# Patient Record
Sex: MALE | Race: WHITE | Employment: FULL TIME | URBAN - METROPOLITAN AREA
[De-identification: names, ages, dates, MRNs, and addresses within clinical notes are randomized per-mention and may not be internally consistent; named-entity substitution may affect disease eponyms.]

---

## 2018-11-26 ENCOUNTER — OFFICE VISIT (OUTPATIENT)
Dept: ORTHOPEDIC SURGERY | Age: 58
End: 2018-11-26

## 2018-11-26 ENCOUNTER — TELEPHONE (OUTPATIENT)
Dept: ORTHOPEDIC SURGERY | Age: 58
End: 2018-11-26

## 2018-11-26 DIAGNOSIS — R52 PAIN: ICD-10-CM

## 2018-11-26 DIAGNOSIS — M75.101 TEAR OF RIGHT ROTATOR CUFF, UNSPECIFIED TEAR EXTENT: Primary | ICD-10-CM

## 2018-11-26 ASSESSMENT — ENCOUNTER SYMPTOMS: COLOR CHANGE: 0

## 2018-11-26 NOTE — PROGRESS NOTES
and decreased strength. He exhibits normal range of motion, no swelling, no effusion, no deformity, no laceration, no spasm and normal pulse. Left shoulder: He exhibits normal range of motion, no tenderness, no bony tenderness, no swelling, no effusion, no crepitus, no deformity, no laceration, no pain, no spasm, normal pulse and normal strength. Neurological: He is alert and oriented to person, place, and time. He has normal strength. No sensory deficit. Skin: Skin is warm and dry. No rash noted. No erythema. No pallor. Right shoulder-positive Correa sign, positive Neer's sign, strength 4/5 to resisted abduction. XRAY  X-ray 3 view of the right shoulder obtained and reviewed by me today in the office demonstrates a type II acromion, moderate degenerative changes and narrowing of the acromioclavicular joint, no subluxation or dislocation noted, no acute osseous abnormalities, no bony prominences, no loose bodies. MRI of the right shoulder from November 21, 2018 reviewed by me today in the office demonstrates a type II acromion, moderate hypertrophy and degenerative changes of the acromioclavicular joint, there is a small full-thickness tear through the supraspinatus tendon insertion to the greater tuberosity with surrounding rotator cuff tendinopathy, there is also a tear of the anterior labrum without any subluxation of the glenohumeral joint. Assessment:      Right shoulder rotator cuff tear  Right shoulder labral tear      Plan:      I discussed with him today his x-ray and MRI findings. I explained to him that he does have a full-thickness tear of his rotator cuff as well as a small tear of his labrum. I explained to him that he could either continue to live with this as is, we could consider conservative options such as physical therapy and cortisone injections or could proceed with definitive surgical treatment.   After explaining all options he would like to proceed with surgical

## 2018-11-27 NOTE — TELEPHONE ENCOUNTER
Office note and C9 for surgery and post op PT request faxed to [facility]. I called and confirmed forms recieved with Félix Mccormack.

## 2018-12-06 ENCOUNTER — TELEPHONE (OUTPATIENT)
Dept: ORTHOPEDIC SURGERY | Age: 58
End: 2018-12-06

## 2018-12-13 ENCOUNTER — TELEPHONE (OUTPATIENT)
Dept: ORTHOPEDIC SURGERY | Age: 58
End: 2018-12-13

## 2019-01-03 ENCOUNTER — ANESTHESIA EVENT (OUTPATIENT)
Dept: OPERATING ROOM | Age: 59
End: 2019-01-03
Payer: COMMERCIAL

## 2019-01-06 DIAGNOSIS — M75.121 COMPLETE TEAR OF RIGHT ROTATOR CUFF: Primary | ICD-10-CM

## 2019-01-06 RX ORDER — OXYCODONE HYDROCHLORIDE AND ACETAMINOPHEN 5; 325 MG/1; MG/1
1 TABLET ORAL EVERY 6 HOURS PRN
Qty: 20 TABLET | Refills: 0 | Status: CANCELLED | OUTPATIENT
Start: 2019-01-06 | End: 2019-01-13

## 2019-01-06 RX ORDER — CEPHALEXIN 250 MG/1
250 CAPSULE ORAL 4 TIMES DAILY
Qty: 4 CAPSULE | Refills: 0 | Status: CANCELLED | OUTPATIENT
Start: 2019-01-06 | End: 2019-01-07

## 2019-01-07 ENCOUNTER — HOSPITAL ENCOUNTER (OUTPATIENT)
Dept: PREADMISSION TESTING | Age: 59
Discharge: HOME | End: 2019-01-11
Payer: COMMERCIAL

## 2019-01-07 VITALS
BODY MASS INDEX: 25.78 KG/M2 | HEIGHT: 68 IN | TEMPERATURE: 97.3 F | WEIGHT: 170.13 LBS | SYSTOLIC BLOOD PRESSURE: 133 MMHG | RESPIRATION RATE: 16 BRPM | OXYGEN SATURATION: 97 % | DIASTOLIC BLOOD PRESSURE: 76 MMHG | HEART RATE: 72 BPM

## 2019-01-07 DIAGNOSIS — M75.101 TEAR OF RIGHT ROTATOR CUFF, UNSPECIFIED TEAR EXTENT: Primary | ICD-10-CM

## 2019-01-07 LAB
ANION GAP SERPL CALCULATED.3IONS-SCNC: 11 MMOL/L (ref 4–16)
BUN BLDV-MCNC: 26 MG/DL (ref 6–23)
CALCIUM SERPL-MCNC: 9.5 MG/DL (ref 8.3–10.6)
CHLORIDE BLD-SCNC: 103 MMOL/L (ref 99–110)
CO2: 25 MMOL/L (ref 21–32)
CREAT SERPL-MCNC: 1.3 MG/DL (ref 0.9–1.3)
GFR AFRICAN AMERICAN: >60 ML/MIN/1.73M2
GFR NON-AFRICAN AMERICAN: 57 ML/MIN/1.73M2
GLUCOSE BLD-MCNC: 89 MG/DL (ref 70–99)
HCT VFR BLD CALC: 45.9 % (ref 42–52)
HEMOGLOBIN: 14.8 GM/DL (ref 13.5–18)
MCH RBC QN AUTO: 30.9 PG (ref 27–31)
MCHC RBC AUTO-ENTMCNC: 32.2 % (ref 32–36)
MCV RBC AUTO: 95.8 FL (ref 78–100)
PDW BLD-RTO: 13.4 % (ref 11.7–14.9)
PLATELET # BLD: 298 K/CU MM (ref 140–440)
PMV BLD AUTO: 9.7 FL (ref 7.5–11.1)
POTASSIUM SERPL-SCNC: 4.9 MMOL/L (ref 3.5–5.1)
RBC # BLD: 4.79 M/CU MM (ref 4.6–6.2)
SODIUM BLD-SCNC: 139 MMOL/L (ref 135–145)
WBC # BLD: 7 K/CU MM (ref 4–10.5)

## 2019-01-07 PROCEDURE — 85027 COMPLETE CBC AUTOMATED: CPT

## 2019-01-07 PROCEDURE — 80048 BASIC METABOLIC PNL TOTAL CA: CPT

## 2019-01-07 PROCEDURE — 36415 COLL VENOUS BLD VENIPUNCTURE: CPT

## 2019-01-07 RX ORDER — OXYCODONE HYDROCHLORIDE AND ACETAMINOPHEN 5; 325 MG/1; MG/1
1 TABLET ORAL EVERY 6 HOURS PRN
Qty: 20 TABLET | Refills: 0 | Status: SHIPPED | OUTPATIENT
Start: 2019-01-07 | End: 2019-01-14

## 2019-01-07 RX ORDER — CEPHALEXIN 250 MG/1
250 CAPSULE ORAL 4 TIMES DAILY
Qty: 4 CAPSULE | Refills: 0 | Status: SHIPPED | OUTPATIENT
Start: 2019-01-07 | End: 2019-01-08

## 2019-01-10 ENCOUNTER — HOSPITAL ENCOUNTER (OUTPATIENT)
Age: 59
Setting detail: OUTPATIENT SURGERY
Discharge: HOME | End: 2019-01-10
Attending: ORTHOPAEDIC SURGERY | Admitting: ORTHOPAEDIC SURGERY
Payer: COMMERCIAL

## 2019-01-10 ENCOUNTER — ANESTHESIA (OUTPATIENT)
Dept: OPERATING ROOM | Age: 59
End: 2019-01-10
Payer: COMMERCIAL

## 2019-01-10 VITALS
HEART RATE: 59 BPM | OXYGEN SATURATION: 97 % | DIASTOLIC BLOOD PRESSURE: 66 MMHG | RESPIRATION RATE: 16 BRPM | TEMPERATURE: 97.9 F | SYSTOLIC BLOOD PRESSURE: 106 MMHG

## 2019-01-10 VITALS
RESPIRATION RATE: 2 BRPM | TEMPERATURE: 96.8 F | SYSTOLIC BLOOD PRESSURE: 96 MMHG | OXYGEN SATURATION: 99 % | DIASTOLIC BLOOD PRESSURE: 70 MMHG

## 2019-01-10 PROCEDURE — 29826 SHO ARTHRS SRG DECOMPRESSION: CPT | Performed by: ORTHOPAEDIC SURGERY

## 2019-01-10 PROCEDURE — 2720000010 HC SURG SUPPLY STERILE: Performed by: ORTHOPAEDIC SURGERY

## 2019-01-10 PROCEDURE — 2500000003 HC RX 250 WO HCPCS: Performed by: NURSE ANESTHETIST, CERTIFIED REGISTERED

## 2019-01-10 PROCEDURE — 7100000001 HC PACU RECOVERY - ADDTL 15 MIN: Performed by: ORTHOPAEDIC SURGERY

## 2019-01-10 PROCEDURE — 6360000002 HC RX W HCPCS: Performed by: ORTHOPAEDIC SURGERY

## 2019-01-10 PROCEDURE — 64415 NJX AA&/STRD BRCH PLXS IMG: CPT | Performed by: ANESTHESIOLOGY

## 2019-01-10 PROCEDURE — 2580000003 HC RX 258: Performed by: ORTHOPAEDIC SURGERY

## 2019-01-10 PROCEDURE — 29827 SHO ARTHRS SRG RT8TR CUF RPR: CPT | Performed by: ORTHOPAEDIC SURGERY

## 2019-01-10 PROCEDURE — L3999 UPPER LIMB ORTHOSIS NOS: HCPCS | Performed by: ORTHOPAEDIC SURGERY

## 2019-01-10 PROCEDURE — 29824 SHO ARTHRS SRG DSTL CLAVICLC: CPT | Performed by: ORTHOPAEDIC SURGERY

## 2019-01-10 PROCEDURE — L3650 SO 8 ABD RESTRAINT PRE OTS: HCPCS | Performed by: ORTHOPAEDIC SURGERY

## 2019-01-10 PROCEDURE — 6360000002 HC RX W HCPCS: Performed by: NURSE ANESTHETIST, CERTIFIED REGISTERED

## 2019-01-10 PROCEDURE — 29823 SHO ARTHRS SRG XTNSV DBRDMT: CPT | Performed by: ORTHOPAEDIC SURGERY

## 2019-01-10 PROCEDURE — 3700000001 HC ADD 15 MINUTES (ANESTHESIA): Performed by: ORTHOPAEDIC SURGERY

## 2019-01-10 PROCEDURE — 3700000000 HC ANESTHESIA ATTENDED CARE: Performed by: ORTHOPAEDIC SURGERY

## 2019-01-10 PROCEDURE — C1713 ANCHOR/SCREW BN/BN,TIS/BN: HCPCS | Performed by: ORTHOPAEDIC SURGERY

## 2019-01-10 PROCEDURE — 7100000010 HC PHASE II RECOVERY - FIRST 15 MIN: Performed by: ORTHOPAEDIC SURGERY

## 2019-01-10 PROCEDURE — 3600000014 HC SURGERY LEVEL 4 ADDTL 15MIN: Performed by: ORTHOPAEDIC SURGERY

## 2019-01-10 PROCEDURE — 6360000002 HC RX W HCPCS: Performed by: ANESTHESIOLOGY

## 2019-01-10 PROCEDURE — 2709999900 HC NON-CHARGEABLE SUPPLY: Performed by: ORTHOPAEDIC SURGERY

## 2019-01-10 PROCEDURE — 6370000000 HC RX 637 (ALT 250 FOR IP): Performed by: ANESTHESIOLOGY

## 2019-01-10 PROCEDURE — 7100000000 HC PACU RECOVERY - FIRST 15 MIN: Performed by: ORTHOPAEDIC SURGERY

## 2019-01-10 PROCEDURE — 7100000011 HC PHASE II RECOVERY - ADDTL 15 MIN: Performed by: ORTHOPAEDIC SURGERY

## 2019-01-10 PROCEDURE — 3600000004 HC SURGERY LEVEL 4 BASE: Performed by: ORTHOPAEDIC SURGERY

## 2019-01-10 DEVICE — IMPLANTABLE DEVICE: Type: IMPLANTABLE DEVICE | Status: FUNCTIONAL

## 2019-01-10 DEVICE — SPEEDSCREW 5.5 MM IMPLANT
Type: IMPLANTABLE DEVICE | Site: SHOULDER | Status: FUNCTIONAL
Brand: SPEEDSCREW

## 2019-01-10 RX ORDER — PROPOFOL 10 MG/ML
INJECTION, EMULSION INTRAVENOUS PRN
Status: DISCONTINUED | OUTPATIENT
Start: 2019-01-10 | End: 2019-01-10 | Stop reason: SDUPTHER

## 2019-01-10 RX ORDER — CEFAZOLIN SODIUM 2 G/100ML
2 INJECTION, SOLUTION INTRAVENOUS
Status: COMPLETED | OUTPATIENT
Start: 2019-01-10 | End: 2019-01-10

## 2019-01-10 RX ORDER — KETOROLAC TROMETHAMINE 5 MG/ML
1 SOLUTION OPHTHALMIC 4 TIMES DAILY
Status: DISCONTINUED | OUTPATIENT
Start: 2019-01-10 | End: 2019-01-10 | Stop reason: HOSPADM

## 2019-01-10 RX ORDER — LIDOCAINE HYDROCHLORIDE 20 MG/ML
INJECTION, SOLUTION INFILTRATION; PERINEURAL PRN
Status: DISCONTINUED | OUTPATIENT
Start: 2019-01-10 | End: 2019-01-10 | Stop reason: SDUPTHER

## 2019-01-10 RX ORDER — FENTANYL CITRATE 50 UG/ML
INJECTION, SOLUTION INTRAMUSCULAR; INTRAVENOUS PRN
Status: DISCONTINUED | OUTPATIENT
Start: 2019-01-10 | End: 2019-01-10 | Stop reason: SDUPTHER

## 2019-01-10 RX ORDER — SODIUM CHLORIDE 0.9 % (FLUSH) 0.9 %
10 SYRINGE (ML) INJECTION PRN
Status: CANCELLED | OUTPATIENT
Start: 2019-01-10

## 2019-01-10 RX ORDER — KETOROLAC TROMETHAMINE 30 MG/ML
INJECTION, SOLUTION INTRAMUSCULAR; INTRAVENOUS PRN
Status: DISCONTINUED | OUTPATIENT
Start: 2019-01-10 | End: 2019-01-10 | Stop reason: SDUPTHER

## 2019-01-10 RX ORDER — HYDRALAZINE HYDROCHLORIDE 20 MG/ML
5 INJECTION INTRAMUSCULAR; INTRAVENOUS EVERY 10 MIN PRN
Status: DISCONTINUED | OUTPATIENT
Start: 2019-01-10 | End: 2019-01-10 | Stop reason: HOSPADM

## 2019-01-10 RX ORDER — SODIUM CHLORIDE, SODIUM LACTATE, POTASSIUM CHLORIDE, CALCIUM CHLORIDE 600; 310; 30; 20 MG/100ML; MG/100ML; MG/100ML; MG/100ML
INJECTION, SOLUTION INTRAVENOUS CONTINUOUS
Status: DISCONTINUED | OUTPATIENT
Start: 2019-01-10 | End: 2019-01-10 | Stop reason: HOSPADM

## 2019-01-10 RX ORDER — ACETAMINOPHEN 10 MG/ML
1000 INJECTION, SOLUTION INTRAVENOUS ONCE
Status: COMPLETED | OUTPATIENT
Start: 2019-01-10 | End: 2019-01-10

## 2019-01-10 RX ORDER — ONDANSETRON 2 MG/ML
INJECTION INTRAMUSCULAR; INTRAVENOUS PRN
Status: DISCONTINUED | OUTPATIENT
Start: 2019-01-10 | End: 2019-01-10 | Stop reason: SDUPTHER

## 2019-01-10 RX ORDER — ACETAMINOPHEN 325 MG/1
650 TABLET ORAL EVERY 4 HOURS PRN
Status: CANCELLED | OUTPATIENT
Start: 2019-01-10

## 2019-01-10 RX ORDER — MEPERIDINE HYDROCHLORIDE 25 MG/ML
12.5 INJECTION INTRAMUSCULAR; INTRAVENOUS; SUBCUTANEOUS EVERY 5 MIN PRN
Status: DISCONTINUED | OUTPATIENT
Start: 2019-01-10 | End: 2019-01-10 | Stop reason: HOSPADM

## 2019-01-10 RX ORDER — SODIUM CHLORIDE 0.9 % (FLUSH) 0.9 %
10 SYRINGE (ML) INJECTION EVERY 12 HOURS SCHEDULED
Status: CANCELLED | OUTPATIENT
Start: 2019-01-10

## 2019-01-10 RX ORDER — SODIUM CHLORIDE 0.9 % (FLUSH) 0.9 %
10 SYRINGE (ML) INJECTION EVERY 12 HOURS SCHEDULED
Status: DISCONTINUED | OUTPATIENT
Start: 2019-01-10 | End: 2019-01-10 | Stop reason: HOSPADM

## 2019-01-10 RX ORDER — SODIUM CHLORIDE 0.9 % (FLUSH) 0.9 %
10 SYRINGE (ML) INJECTION PRN
Status: DISCONTINUED | OUTPATIENT
Start: 2019-01-10 | End: 2019-01-10 | Stop reason: HOSPADM

## 2019-01-10 RX ORDER — HYDROCODONE BITARTRATE AND ACETAMINOPHEN 5; 325 MG/1; MG/1
1 TABLET ORAL EVERY 4 HOURS PRN
Status: CANCELLED | OUTPATIENT
Start: 2019-01-10

## 2019-01-10 RX ORDER — HYDROCODONE BITARTRATE AND ACETAMINOPHEN 5; 325 MG/1; MG/1
2 TABLET ORAL EVERY 4 HOURS PRN
Status: CANCELLED | OUTPATIENT
Start: 2019-01-10

## 2019-01-10 RX ORDER — HYDROMORPHONE HCL 110MG/55ML
0.5 PATIENT CONTROLLED ANALGESIA SYRINGE INTRAVENOUS EVERY 5 MIN PRN
Status: DISCONTINUED | OUTPATIENT
Start: 2019-01-10 | End: 2019-01-10 | Stop reason: HOSPADM

## 2019-01-10 RX ORDER — LABETALOL HYDROCHLORIDE 5 MG/ML
5 INJECTION, SOLUTION INTRAVENOUS EVERY 10 MIN PRN
Status: DISCONTINUED | OUTPATIENT
Start: 2019-01-10 | End: 2019-01-10 | Stop reason: HOSPADM

## 2019-01-10 RX ORDER — DEXAMETHASONE SODIUM PHOSPHATE 4 MG/ML
INJECTION, SOLUTION INTRA-ARTICULAR; INTRALESIONAL; INTRAMUSCULAR; INTRAVENOUS; SOFT TISSUE PRN
Status: DISCONTINUED | OUTPATIENT
Start: 2019-01-10 | End: 2019-01-10 | Stop reason: SDUPTHER

## 2019-01-10 RX ORDER — DOCUSATE SODIUM 100 MG/1
100 CAPSULE, LIQUID FILLED ORAL 2 TIMES DAILY
Status: CANCELLED | OUTPATIENT
Start: 2019-01-10

## 2019-01-10 RX ORDER — FENTANYL CITRATE 50 UG/ML
50 INJECTION, SOLUTION INTRAMUSCULAR; INTRAVENOUS EVERY 5 MIN PRN
Status: DISCONTINUED | OUTPATIENT
Start: 2019-01-10 | End: 2019-01-10 | Stop reason: HOSPADM

## 2019-01-10 RX ORDER — DIPHENHYDRAMINE HYDROCHLORIDE 50 MG/ML
12.5 INJECTION INTRAMUSCULAR; INTRAVENOUS
Status: DISCONTINUED | OUTPATIENT
Start: 2019-01-10 | End: 2019-01-10 | Stop reason: HOSPADM

## 2019-01-10 RX ORDER — SODIUM CHLORIDE, SODIUM LACTATE, POTASSIUM CHLORIDE, CALCIUM CHLORIDE 600; 310; 30; 20 MG/100ML; MG/100ML; MG/100ML; MG/100ML
INJECTION, SOLUTION INTRAVENOUS CONTINUOUS
Status: CANCELLED | OUTPATIENT
Start: 2019-01-10

## 2019-01-10 RX ORDER — KETOROLAC TROMETHAMINE 30 MG/ML
30 INJECTION, SOLUTION INTRAMUSCULAR; INTRAVENOUS EVERY 6 HOURS
Status: CANCELLED | OUTPATIENT
Start: 2019-01-10 | End: 2019-01-12

## 2019-01-10 RX ORDER — PROMETHAZINE HYDROCHLORIDE 25 MG/ML
6.25 INJECTION, SOLUTION INTRAMUSCULAR; INTRAVENOUS
Status: DISCONTINUED | OUTPATIENT
Start: 2019-01-10 | End: 2019-01-10 | Stop reason: HOSPADM

## 2019-01-10 RX ORDER — ROCURONIUM BROMIDE 10 MG/ML
INJECTION, SOLUTION INTRAVENOUS PRN
Status: DISCONTINUED | OUTPATIENT
Start: 2019-01-10 | End: 2019-01-10 | Stop reason: SDUPTHER

## 2019-01-10 RX ADMIN — SODIUM CHLORIDE, POTASSIUM CHLORIDE, SODIUM LACTATE AND CALCIUM CHLORIDE: 600; 310; 30; 20 INJECTION, SOLUTION INTRAVENOUS at 08:29

## 2019-01-10 RX ADMIN — ONDANSETRON HYDROCHLORIDE 4 MG: 2 SOLUTION INTRAMUSCULAR; INTRAVENOUS at 09:07

## 2019-01-10 RX ADMIN — PROPOFOL 200 MG: 10 INJECTION, EMULSION INTRAVENOUS at 08:50

## 2019-01-10 RX ADMIN — ACETAMINOPHEN 1000 MG: 10 INJECTION, SOLUTION INTRAVENOUS at 09:15

## 2019-01-10 RX ADMIN — KETOROLAC TROMETHAMINE 30 MG: 30 INJECTION, SOLUTION INTRAMUSCULAR; INTRAVENOUS at 10:05

## 2019-01-10 RX ADMIN — LIDOCAINE HYDROCHLORIDE 100 MG: 20 INJECTION, SOLUTION INFILTRATION; PERINEURAL at 08:50

## 2019-01-10 RX ADMIN — CEFAZOLIN SODIUM 2 G: 2 INJECTION, SOLUTION INTRAVENOUS at 08:50

## 2019-01-10 RX ADMIN — SODIUM CHLORIDE, POTASSIUM CHLORIDE, SODIUM LACTATE AND CALCIUM CHLORIDE: 600; 310; 30; 20 INJECTION, SOLUTION INTRAVENOUS at 10:05

## 2019-01-10 RX ADMIN — DEXAMETHASONE SODIUM PHOSPHATE 8 MG: 4 INJECTION, SOLUTION INTRAMUSCULAR; INTRAVENOUS at 09:07

## 2019-01-10 RX ADMIN — FENTANYL CITRATE 100 MCG: 50 INJECTION INTRAMUSCULAR; INTRAVENOUS at 08:50

## 2019-01-10 RX ADMIN — ROCURONIUM BROMIDE 50 MG: 50 INJECTION, SOLUTION INTRAVENOUS at 08:50

## 2019-01-10 RX ADMIN — KETOROLAC TROMETHAMINE 1 DROP: 5 SOLUTION/ DROPS OPHTHALMIC at 11:21

## 2019-01-10 ASSESSMENT — PAIN SCALES - GENERAL
PAINLEVEL_OUTOF10: 0
PAINLEVEL_OUTOF10: 0

## 2019-01-10 ASSESSMENT — PULMONARY FUNCTION TESTS
PIF_VALUE: 19
PIF_VALUE: 14
PIF_VALUE: 19
PIF_VALUE: 9
PIF_VALUE: 19
PIF_VALUE: 19
PIF_VALUE: 14
PIF_VALUE: 4
PIF_VALUE: 19
PIF_VALUE: 18
PIF_VALUE: 19
PIF_VALUE: 19
PIF_VALUE: 18
PIF_VALUE: 18
PIF_VALUE: 19
PIF_VALUE: 2
PIF_VALUE: 18
PIF_VALUE: 17
PIF_VALUE: 20
PIF_VALUE: 6
PIF_VALUE: 18
PIF_VALUE: 17
PIF_VALUE: 18
PIF_VALUE: 19
PIF_VALUE: 18
PIF_VALUE: 0
PIF_VALUE: 18
PIF_VALUE: 19
PIF_VALUE: 18
PIF_VALUE: 19
PIF_VALUE: 18
PIF_VALUE: 20
PIF_VALUE: 15
PIF_VALUE: 17
PIF_VALUE: 8
PIF_VALUE: 19
PIF_VALUE: 18
PIF_VALUE: 18
PIF_VALUE: 19
PIF_VALUE: 19
PIF_VALUE: 15
PIF_VALUE: 18
PIF_VALUE: 19
PIF_VALUE: 15
PIF_VALUE: 12
PIF_VALUE: 18
PIF_VALUE: 16
PIF_VALUE: 19
PIF_VALUE: 2
PIF_VALUE: 19
PIF_VALUE: 18
PIF_VALUE: 19
PIF_VALUE: 1
PIF_VALUE: 19
PIF_VALUE: 18
PIF_VALUE: 19
PIF_VALUE: 15
PIF_VALUE: 25
PIF_VALUE: 19
PIF_VALUE: 6
PIF_VALUE: 15
PIF_VALUE: 18
PIF_VALUE: 2
PIF_VALUE: 1
PIF_VALUE: 19
PIF_VALUE: 18
PIF_VALUE: 18
PIF_VALUE: 19
PIF_VALUE: 18
PIF_VALUE: 19
PIF_VALUE: 19
PIF_VALUE: 18
PIF_VALUE: 19
PIF_VALUE: 18
PIF_VALUE: 16
PIF_VALUE: 26
PIF_VALUE: 15
PIF_VALUE: 3
PIF_VALUE: 8
PIF_VALUE: 19
PIF_VALUE: 18
PIF_VALUE: 19
PIF_VALUE: 18
PIF_VALUE: 19
PIF_VALUE: 18
PIF_VALUE: 18
PIF_VALUE: 19
PIF_VALUE: 15
PIF_VALUE: 15
PIF_VALUE: 18
PIF_VALUE: 15
PIF_VALUE: 19
PIF_VALUE: 1
PIF_VALUE: 17
PIF_VALUE: 19

## 2019-01-16 ENCOUNTER — OFFICE VISIT (OUTPATIENT)
Dept: ORTHOPEDIC SURGERY | Age: 59
End: 2019-01-16

## 2019-01-16 VITALS — WEIGHT: 168 LBS | HEIGHT: 69 IN | BODY MASS INDEX: 24.88 KG/M2 | RESPIRATION RATE: 12 BRPM

## 2019-01-16 DIAGNOSIS — Z98.890 S/P RIGHT ROTATOR CUFF REPAIR: Primary | ICD-10-CM

## 2019-01-16 DIAGNOSIS — Z98.890 S/P ARTHROSCOPY OF RIGHT SHOULDER: ICD-10-CM

## 2019-01-16 DIAGNOSIS — Z09 POSTOP CHECK: ICD-10-CM

## 2019-01-16 PROCEDURE — 99024 POSTOP FOLLOW-UP VISIT: CPT | Performed by: PHYSICIAN ASSISTANT

## 2019-01-16 RX ORDER — MOMETASONE FUROATE 1 MG/G
CREAM TOPICAL
Refills: 1 | COMMUNITY
Start: 2018-12-15

## 2019-01-16 RX ORDER — CEPHALEXIN 250 MG/1
CAPSULE ORAL
Refills: 0 | COMMUNITY
Start: 2019-01-10

## 2019-01-22 ENCOUNTER — HOSPITAL ENCOUNTER (OUTPATIENT)
Dept: PHYSICAL THERAPY | Age: 59
Setting detail: THERAPIES SERIES
Discharge: HOME | End: 2019-01-22
Payer: COMMERCIAL

## 2019-01-22 PROCEDURE — 97161 PT EVAL LOW COMPLEX 20 MIN: CPT

## 2019-01-22 PROCEDURE — 97110 THERAPEUTIC EXERCISES: CPT

## 2019-01-22 PROCEDURE — 97016 VASOPNEUMATIC DEVICE THERAPY: CPT

## 2019-01-22 PROCEDURE — 97140 MANUAL THERAPY 1/> REGIONS: CPT

## 2019-01-25 ENCOUNTER — HOSPITAL ENCOUNTER (OUTPATIENT)
Dept: PHYSICAL THERAPY | Age: 59
Setting detail: THERAPIES SERIES
Discharge: HOME | End: 2019-01-25
Payer: COMMERCIAL

## 2019-01-25 PROCEDURE — 97140 MANUAL THERAPY 1/> REGIONS: CPT

## 2019-01-25 PROCEDURE — 97110 THERAPEUTIC EXERCISES: CPT

## 2019-01-28 ENCOUNTER — HOSPITAL ENCOUNTER (OUTPATIENT)
Dept: PHYSICAL THERAPY | Age: 59
Setting detail: THERAPIES SERIES
Discharge: HOME | End: 2019-01-28
Payer: COMMERCIAL

## 2019-01-28 PROCEDURE — 97140 MANUAL THERAPY 1/> REGIONS: CPT

## 2019-01-28 PROCEDURE — 97016 VASOPNEUMATIC DEVICE THERAPY: CPT

## 2019-01-28 PROCEDURE — 97110 THERAPEUTIC EXERCISES: CPT

## 2019-02-01 ENCOUNTER — HOSPITAL ENCOUNTER (OUTPATIENT)
Dept: PHYSICAL THERAPY | Age: 59
Setting detail: THERAPIES SERIES
Discharge: HOME | End: 2019-02-01
Payer: COMMERCIAL

## 2019-02-01 PROCEDURE — 97110 THERAPEUTIC EXERCISES: CPT

## 2019-02-01 PROCEDURE — 97140 MANUAL THERAPY 1/> REGIONS: CPT

## 2019-02-04 ENCOUNTER — HOSPITAL ENCOUNTER (OUTPATIENT)
Dept: PHYSICAL THERAPY | Age: 59
Setting detail: THERAPIES SERIES
Discharge: HOME | End: 2019-02-04
Payer: COMMERCIAL

## 2019-02-04 PROCEDURE — 97110 THERAPEUTIC EXERCISES: CPT

## 2019-02-04 PROCEDURE — 97140 MANUAL THERAPY 1/> REGIONS: CPT

## 2019-02-08 ENCOUNTER — HOSPITAL ENCOUNTER (OUTPATIENT)
Dept: PHYSICAL THERAPY | Age: 59
Setting detail: THERAPIES SERIES
Discharge: HOME | End: 2019-02-08
Payer: COMMERCIAL

## 2019-02-08 PROCEDURE — 97110 THERAPEUTIC EXERCISES: CPT

## 2019-02-08 PROCEDURE — 97140 MANUAL THERAPY 1/> REGIONS: CPT

## 2019-02-11 ENCOUNTER — HOSPITAL ENCOUNTER (OUTPATIENT)
Dept: PHYSICAL THERAPY | Age: 59
Setting detail: THERAPIES SERIES
Discharge: HOME | End: 2019-02-11
Payer: COMMERCIAL

## 2019-02-11 PROCEDURE — 97110 THERAPEUTIC EXERCISES: CPT

## 2019-02-11 PROCEDURE — 97032 APPL MODALITY 1+ESTIM EA 15: CPT

## 2019-02-18 ENCOUNTER — HOSPITAL ENCOUNTER (OUTPATIENT)
Dept: PHYSICAL THERAPY | Age: 59
Setting detail: THERAPIES SERIES
Discharge: HOME | End: 2019-02-18
Payer: COMMERCIAL

## 2019-02-18 ENCOUNTER — OFFICE VISIT (OUTPATIENT)
Dept: ORTHOPEDIC SURGERY | Age: 59
End: 2019-02-18

## 2019-02-18 DIAGNOSIS — Z98.890 S/P RIGHT ROTATOR CUFF REPAIR: Primary | ICD-10-CM

## 2019-02-18 PROCEDURE — 99024 POSTOP FOLLOW-UP VISIT: CPT | Performed by: ORTHOPAEDIC SURGERY

## 2019-02-18 PROCEDURE — 97140 MANUAL THERAPY 1/> REGIONS: CPT

## 2019-02-18 PROCEDURE — 97110 THERAPEUTIC EXERCISES: CPT

## 2019-02-18 ASSESSMENT — ENCOUNTER SYMPTOMS: COLOR CHANGE: 0

## 2019-02-25 ENCOUNTER — HOSPITAL ENCOUNTER (OUTPATIENT)
Dept: PHYSICAL THERAPY | Age: 59
Setting detail: THERAPIES SERIES
Discharge: HOME | End: 2019-02-25
Payer: COMMERCIAL

## 2019-02-25 PROCEDURE — 97110 THERAPEUTIC EXERCISES: CPT

## 2019-02-25 PROCEDURE — 97530 THERAPEUTIC ACTIVITIES: CPT

## 2019-03-04 ENCOUNTER — HOSPITAL ENCOUNTER (OUTPATIENT)
Dept: PHYSICAL THERAPY | Age: 59
Setting detail: THERAPIES SERIES
Discharge: HOME | End: 2019-03-04
Payer: COMMERCIAL

## 2019-03-04 PROCEDURE — 97110 THERAPEUTIC EXERCISES: CPT

## 2019-03-04 PROCEDURE — 97530 THERAPEUTIC ACTIVITIES: CPT

## 2019-03-11 ENCOUNTER — HOSPITAL ENCOUNTER (OUTPATIENT)
Dept: PHYSICAL THERAPY | Age: 59
Setting detail: THERAPIES SERIES
Discharge: HOME | End: 2019-03-11
Payer: COMMERCIAL

## 2019-03-11 PROCEDURE — 97530 THERAPEUTIC ACTIVITIES: CPT

## 2019-03-11 PROCEDURE — 97110 THERAPEUTIC EXERCISES: CPT

## 2019-03-18 ENCOUNTER — HOSPITAL ENCOUNTER (OUTPATIENT)
Dept: PHYSICAL THERAPY | Age: 59
Setting detail: THERAPIES SERIES
Discharge: HOME | End: 2019-03-18
Payer: COMMERCIAL

## 2019-03-18 PROCEDURE — 97110 THERAPEUTIC EXERCISES: CPT

## 2019-03-18 PROCEDURE — 97530 THERAPEUTIC ACTIVITIES: CPT

## 2019-03-25 ENCOUNTER — HOSPITAL ENCOUNTER (OUTPATIENT)
Dept: PHYSICAL THERAPY | Age: 59
Setting detail: THERAPIES SERIES
Discharge: HOME | End: 2019-03-25
Payer: COMMERCIAL

## 2019-03-25 PROCEDURE — 97110 THERAPEUTIC EXERCISES: CPT

## 2019-03-25 PROCEDURE — 97112 NEUROMUSCULAR REEDUCATION: CPT

## 2019-04-01 ENCOUNTER — OFFICE VISIT (OUTPATIENT)
Dept: ORTHOPEDIC SURGERY | Age: 59
End: 2019-04-01

## 2019-04-01 ENCOUNTER — HOSPITAL ENCOUNTER (OUTPATIENT)
Dept: PHYSICAL THERAPY | Age: 59
Setting detail: THERAPIES SERIES
Discharge: HOME | End: 2019-04-01
Payer: COMMERCIAL

## 2019-04-01 DIAGNOSIS — Z98.890 S/P RIGHT ROTATOR CUFF REPAIR: Primary | ICD-10-CM

## 2019-04-01 PROCEDURE — 97112 NEUROMUSCULAR REEDUCATION: CPT

## 2019-04-01 PROCEDURE — 97530 THERAPEUTIC ACTIVITIES: CPT

## 2019-04-01 PROCEDURE — 97110 THERAPEUTIC EXERCISES: CPT

## 2019-04-01 PROCEDURE — 99024 POSTOP FOLLOW-UP VISIT: CPT | Performed by: ORTHOPAEDIC SURGERY

## 2019-04-01 ASSESSMENT — ENCOUNTER SYMPTOMS: COLOR CHANGE: 0

## 2019-04-01 NOTE — PROGRESS NOTES
Outpatient Physical Therapy        [x] Phone: [phone]   Fax: [phone]  Referring Practitioner: Mireya Chandra                                        From: Killian Cho PT             Patient: Rhona Sánchez                                                         : 1960  Diagnosis: R RC repair 19            Treatment Diagnosis: R shoulder stiffness          Date: 2019    [x]  Progress Note                []  Discharge Note    Total Visits to date:  15     Cancels/No-shows to date:       Subjective:  Mr. Bautista December reports consistent improvement with pain relief-sleeping well    Prominent Objective Findings:    FLEX and ER AROM near WNL-   IR behind back most limited ROM @ present    Suggested plan: [x]  Given the size of RC tear and work requirements, Continue PT and remain off work @ least through April []   Electronically signed by:  Killian Cho PT, 2019, 2:04 PM    If you have any questions or concerns, please don't hesitate to call.   Thank you for your referral.    Physician Signature:______________________ Date:______ Time: ________  By signing above, therapists plan is approved by physician

## 2019-04-01 NOTE — PROGRESS NOTES
shoulder-incision well healed, no redness, no signs of infection. Full range of motion in all directions, strength 5/5. Assessment:      Right shoulder arthroscopic rotator cuff repair, 3 months       Plan:      I discussed with him today that he is  continuing to progress extremely well. At this point he can resume all activities with no restrictions. I explained to him that maximum improvement is 9 months after this type of surgery. Continue weight-bearing as tolerated. Continue range of motion exercises as instructed. Ice and elevate as needed. Tylenol or Motrin for pain. Follow up as needed.          Dick 97, DO

## 2019-04-01 NOTE — FLOWSHEET NOTE
Code/Total Treatment Minutes:  40/40    1 TA   1 NR  1 TE  ext Progress Note due:  10 sessions      Plan of Care Interventions:  [x] Therapeutic Exercise  [] Modalities:  [x] Therapeutic Activity     [] Ultrasound  [] Estem  [] Gait Training      [] Cervical Traction [] Lumbar Traction  [x] Neuromuscular Re-education    [] Cold/hotpack [] Iontophoresis   [x] Instruction in HEP      [x] Vasopneumatic   [] Dry Needling    [x] Manual Therapy               [] Aquatic Therapy              Electronically signed by:  Stephen Martin 4/1/2019, 2:02 PM

## 2019-04-11 ENCOUNTER — HOSPITAL ENCOUNTER (OUTPATIENT)
Dept: PHYSICAL THERAPY | Age: 59
Setting detail: THERAPIES SERIES
Discharge: HOME | End: 2019-04-11
Payer: COMMERCIAL

## 2019-04-11 PROCEDURE — 97140 MANUAL THERAPY 1/> REGIONS: CPT

## 2019-04-11 PROCEDURE — 97530 THERAPEUTIC ACTIVITIES: CPT

## 2019-04-11 PROCEDURE — 97110 THERAPEUTIC EXERCISES: CPT

## 2019-04-11 NOTE — FLOWSHEET NOTE
Outpatient Physical Therapy  [place]           [x] Phone: [phone]   Fax: [phone]  [place] [place]           [] Phone: [phone]   Fax: [phone]        Physical Therapy Daily Treatment Note  Date:  2019    Patient Name:  Frannie Ta    :  1960  MRN: [id number]  Restrictions/Precautions: Other position/activity restrictions: in sling  Diagnosis:   Diagnosis: R RC repair 19  Date of Injury/Surgery: R RC repair 19  Treatment Diagnosis: Treatment Diagnosis: R shoulder stiffness    Insurance/Certification information: PT Insurance Information: BC/BS 20 sessions PCY   Referring Physician:  Referring Practitioner: Simona Osullivan  Next Doctor Visit:    Plan of care signed (Y/N):    Outcome Measure: Quick DASH  Visit# / total visits:   15/20   POC expires 19  Pain level: 0/10 R shldr  Goals:       Short term goals  Long term goals  Time Frame for Long term goals : check in 12 weeks  Long term goal 1: patient's goal- maximize function of R UE  Long term goal 2: 3+/5 R shoulder FLEX MMT  Long term goal 3:  Quick DASH    Summary of Evaluation:    Patient primary complaints: R shoulder stiffness       s-  History of condition:R RC repair 19          Current functional limitations: in sling @ all times  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   None        Objective:   R IR behind back to T12    Exercises: (No more than 4 columns)   Exercise/Equipment 3/18/19 #12 3/25/19 #13 19 #14 19  #15              WARM UP UBE FWD/BWD 3 min ea UBE FWD/BWD 5 min ea UBE FWD/BWD 5 min ea UBE FWD/BWD 5 min ea            Prone HABD           TABLE        Supine PROM- manual FLEX and ER/IR in scap plane FLEX and ER/IR in scap plane FLEX and ER/IR in scap plane FLEX and ER/IR in scap plane            Countertop walk aways 2 x15 ea next 1 x10 x1 x3    Prone EXT 3x10 with ball in hand 3x10 with 2# ball in hand 3x10 with3#  in  3x10 with4#          Standing vertical r ball rolls with B UE- elbows bent Standing abd circles ball on wall x 20, scaption Standing abd circles  2#ball on wall x 20, scaption Standing abd circles  2#ball on wall x 245, scaption     Standing vertical r ball rolls with B UE- elbows bent Lateral saw ball x 15 Standing vertical r ball rolls with B UE- elbows bent;  Standing vertical r ball rolls with B UE- elbows bent;     standing towel slides- vertical/ horizontal standing towel slides- vertical/ horizontal standing towel slides- vertical/ horizontal    x25 standing towel slides- vertical/ horizontal    x25    isometric ER  wiith walkouts- rhyth. stabs With walk outs rhyth.stab 20 x 3 eyes open, 20 x 3 eyes closed. With walk outs rhyth.stab 5 repseyes closed. With walk outs rhyth.stab 5 repseyes closed. Paloff press  BTB x 15 BTB x 15 Blue TB x 15 with RSTAB    IR AAROM  Behind back x 10 Behind back x 10 With strap x 10        ABD with ball roll up wall x30    MODALITIES                            Other Therapeutic Activities/Education:  Pt received education on their current pathology and how their condition affects functional activities. Pt understood discussion and questions were answered. Pt understands their activity limitations and understands rational for treatment progression.         Home Exercise Program: review      Manual Treatments:  PROM R shoulder      Modalities:       Communication with other providers:        Assessment: , 0/10 pain      Plan for Next Session: Cont PT progress as toelrated      Time In / Time Out: 1131/-1210  Timed Code/Total Treatment Minutes:   8\"MT x1  10\" TA x 1, 21\" TE x 1/39  ext Progress Note due:  10 sessions      Plan of Care Interventions:  [x] Therapeutic Exercise  [] Modalities:  [x] Therapeutic Activity     [] Ultrasound  [] Estem  [] Gait Training      [] Cervical Traction [] Lumbar Traction  [x] Neuromuscular Re-education    [] Cold/hotpack [] Iontophoresis   [x] Instruction in HEP      [x] Vasopneumatic   [] Dry Needling    [x] Manual Therapy               [] Aquatic Therapy              Electronically signed by:  Alysa Patiño 4/11/2019, 11:35 AM

## 2019-04-19 ENCOUNTER — HOSPITAL ENCOUNTER (OUTPATIENT)
Dept: PHYSICAL THERAPY | Age: 59
Setting detail: THERAPIES SERIES
Discharge: HOME | End: 2019-04-19
Payer: COMMERCIAL

## 2019-04-19 PROCEDURE — 97530 THERAPEUTIC ACTIVITIES: CPT

## 2019-04-19 PROCEDURE — 97140 MANUAL THERAPY 1/> REGIONS: CPT

## 2019-04-19 PROCEDURE — 97110 THERAPEUTIC EXERCISES: CPT

## 2019-04-19 NOTE — FLOWSHEET NOTE
Outpatient Physical Therapy  [place]           [x] Phone: [phone]   Fax: [phone]  [facility]           [] Phone: [phone]   Fax: [phone]        Physical Therapy Daily Treatment Note  Date:  2019    Patient Name:  Denise Sullivan    :  1960  MRN: [id number]  Restrictions/Precautions: Other position/activity restrictions: in sling  Diagnosis:   Diagnosis: R RC repair 19  Date of Injury/Surgery: R RC repair 19  Treatment Diagnosis: Treatment Diagnosis: R shoulder stiffness    Insurance/Certification information: PT Insurance Information: BC/BS 20 sessions PCY   Referring Physician:  Referring Practitioner: Sofia Townsend  Next Doctor Visit:    Plan of care signed (Y/N):    Outcome Measure: Quick DASH  Visit# / total visits:      POC expires 19  Pain level: 0/10 R shldr  Goals:       Short term goals  Long term goals  Time Frame for Long term goals : check in 12 weeks  Long term goal 1: patient's goal- maximize function of R UE  Long term goal 2: 3+/5 R shoulder FLEX MMT  Long term goal 3:  Quick DASH    Summary of Evaluation:    Patient primary complaints: R shoulder stiffness        History of condition:R RC repair 19          Current functional limitations: in sling @ all times  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   None        Objective:   R IR behind back to T11    Exercises: (No more than 4 columns)   Exercise/Equipment 3/18/19 #12 3/25/19 #13 19 #14 19  #15 19 #16             WARM UP UBE FWD/BWD 3 min ea UBE FWD/BWD 5 min ea UBE FWD/BWD 5 min ea UBE FWD/BWD 5 min ea UBE FWD/BWD 5 min ea           Prone HABD TABLE        Supine PROM- manual FLEX and ER/IR in scap plane FLEX and ER/IR in scap plane FLEX and ER/IR in scap plane FLEX and ER/IR in scap plane FLEX and ER/IR in scap plane           Countertop walk aways 2 x15 ea next 1 x10 x1 x3 1 x5   Prone EXT 3x10 with ball in hand 3x10 with 2# ball in hand 3x10 with3#  in  3x10 with4# 3x10 with5#         Standing vertical r ball rolls with B UE- elbows bent Standing abd circles ball on wall x 20, scaption Standing abd circles  2#ball on wall x 20, scaption Standing abd circles  2#ball on wall x 245, scaption Standing abd circles  2#ball on wall x 25, scaption    Standing vertical r ball rolls with B UE- elbows bent Lateral saw ball x 15 Standing vertical r ball rolls with B UE- elbows bent;  Standing vertical r ball rolls with B UE- elbows bent; Standing vertical r ball rolls with B UE- elbows bent;    standing towel slides- vertical/ horizontal standing towel slides- vertical/ horizontal standing towel slides- vertical/ horizontal    x25 standing towel slides- vertical/ horizontal    x25 standing towel slides- vertical/ horizontal    x25   isometric ER  wiith walkouts- rhyth. stabs With walk outs rhyth.stab 20 x 3 eyes open, 20 x 3 eyes closed. With walk outs rhyth.stab 5 repseyes closed. With walk outs rhyth.stab 5 repseyes closed. With walk outs rhyth.stab 5 repseyes closed. Paloff press  BTB x 15 BTB x 15 Blue TB x 15 with RSTAB Blue TB x 10 with RSTAB   IR AAROM  Behind back x 10 Behind back x 10 With strap x 10        ABD with ball roll up wall x30 ABD with ball roll up wall x30   MODALITIES                            Other Therapeutic Activities/Education:  Pt received education on their current pathology and how their condition affects functional activities. Pt understood discussion and questions were answered. Pt understands their activity limitations and understands rational for treatment progression.         Home Exercise Program: review      Manual

## 2019-04-19 NOTE — PLAN OF CARE
Outpatient Physical Therapy        [x] Phone: [phone]   Fax: [phone]  To: Referring Practitioner: David Dhaliwal                                        From: Craig Husain PT             Patient: Solo Calix                                                         : 1960  Diagnosis: R RC repair 19            Treatment Diagnosis: R shoulder stiffness             Date: 2019    [x]  Progress Note                []  Discharge Note    Total Visits to date:16       Cancels/No-shows to date:     Subjective:  Pt reports he will return to work in   Prominent Objective Findings:    R shoulder strength @ least 4/5  Assessment:    Patient primary complaints: R shoulder stiffness        History of condition:R RC repair 19          Current functional limitations: in sling @ all times  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[employer]  Prominent clinical findings:limited shoulder PROM  Progressive treatment plan will emphasize: progressive ROM  Barriers to learning:none  Potential barriers to progress: The patient appears/reports motivated to regain PLOF: yes  Goal Status:  [] Achieved [x] Partially Achieved  [] Not Achieved     Long term goal 1: patient's goal- maximize function of R UE  Long term goal 2: 3+/5 R shoulder FLEX MMT-meeting  Long term goal 3:  Quick DASH- was      Changes to goals:    Planned Services:  [x] Therapeutic Exercise    [] Modalities:  [x] Therapeutic Activity     [] Ultrasound  [] Electric Stimulation  [] Gait Training      [] Cervical Traction    [] Lumbar Traction  [x] Neuromuscular Re-education  [] Cold/hotpack [] Iontophoresis  [] Instruction in HEP      Other:  [x] Manual Therapy       []  Vasopneumatic  [x] Self care management                           [] Dry needling trigger point/pain management                ? Plan of Care Date Range: till 19  Frequency/Duration:  # Days per week: [x] 1 day # Weeks: [] 1 week [x] 4 weeks      [] 2 days?    [] 2 weeks [] 5 weeks      [] 3 days   [] 3 weeks [] 6 weeks   Rehab Potential: [] Excellent [x] Good [] Fair  [] Poor     Patient Status: [x] Continue per initial Plan of Care     [] Patient now discharged     [x] Additional visits requested, Please re-certify for additional visits:      Requested frequency/duration: 1/week for 4weeks  If we are requesting more visits, we fully anticipate the patient's condition is expected to improve within the treatment timeframe we are requesting. Electronically signed by:  Murtaza Jarrett PT, 4/19/2019, 6:14 PM  If you have any questions or concerns, please don't hesitate to call.   Thank you for your referral.    Physician Signature:______________________ Date:______ Time: ________  By signing above, therapists plan is approved by physician

## 2019-04-26 ENCOUNTER — HOSPITAL ENCOUNTER (OUTPATIENT)
Dept: PHYSICAL THERAPY | Age: 59
Setting detail: THERAPIES SERIES
Discharge: HOME | End: 2019-04-26
Payer: COMMERCIAL

## 2019-04-26 PROCEDURE — 97110 THERAPEUTIC EXERCISES: CPT

## 2019-04-26 NOTE — FLOWSHEET NOTE
Outpatient Physical Therapy  [place]           [x] Phone: [phone]   Fax: [phone]  [facility]           [] Phone: [phone]   Fax: [phone]        Physical Therapy Daily Treatment Note  Date:  2019    Patient Name:  Glenroy Nunez    :    MRN: [id number]  Restrictions/Precautions: Other position/activity restrictions: in sling  Diagnosis:   Diagnosis: R RC repair 19  Date of Injury/Surgery: R RC repair 19  Treatment Diagnosis: Treatment Diagnosis: R shoulder stiffness    Insurance/Certification information: PT Insurance Information: BC/BS 20 sessions PCY   Referring Physician:  Referring Practitioner: Candelaria Garrison  Next Doctor Visit:    Plan of care signed (Y/N):    Outcome Measure: Quick DASH  Visit# / total visits:      POC expires 19  Pain level: 0/10 R shldr  Goals:       Short term goals  Long term goals  Time Frame for Long term goals : check in 12 weeks  Long term goal 1: patient's goal- maximize function of R UE  Long term goal 2: 3+/5 R shoulder FLEX MMT  Long term goal 3:  Quick DASH    Summary of Evaluation:    Patient primary complaints: R shoulder stiffness        History of condition:R RC repair 19          Current functional limitations: in sling @ all times  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   None        Objective:   R IR behind back to T11    Exercises: (No more than 4 columns)   Exercise/Equipment 19 #14 19  #15 19 #16 2019 (17)            WARM UP UBE FWD/BWD 5 min ea UBE FWD/BWD 5 min ea UBE FWD/BWD 5 min ea UBE FWD/BWD 5 min ea         Prone HABD           TABLE       Supine PROM- 1/40  ext Progress Note due:  10 sessions      Plan of Care Interventions:  [x] Therapeutic Exercise  [] Modalities:  [x] Therapeutic Activity     [] Ultrasound  [] Estem  [] Gait Training      [] Cervical Traction [] Lumbar Traction  [x] Neuromuscular Re-education    [] Cold/hotpack [] Iontophoresis   [x] Instruction in HEP      [x] Vasopneumatic   [] Dry Needling    [x] Manual Therapy               [] Aquatic Therapy              Electronically signed by:  Bree Lowe 4/26/2019, 11:13 AM

## 2019-05-02 ENCOUNTER — TELEPHONE (OUTPATIENT)
Dept: ORTHOPEDIC SURGERY | Age: 59
End: 2019-05-02

## 2019-05-02 NOTE — TELEPHONE ENCOUNTER
Dr Yony Carney Last office plan: 4/1/19  discussed with him today that he is  continuing to progress extremely well. At this point he can resume all activities with no restrictions. I explained to him that maximum improvement is 9 months after this type of surgery. Continue weight-bearing as tolerated. Continue range of motion exercises as instructed. Ice and elevate as needed. Tylenol or Motrin for pain. Follow up as needed. Employer would like to know if patient was to return to work at this point during his last visit. Pt states that therapy recommended that he stay off work per PT notes. Physical therapy Plan:4/1/19  Suggested plan:          [x]  Given the size of RC tear and work requirements, Continue PT and remain off work @ least through April    []   Electronically signed by:  Marichuy Rosario, PT, 4/1/2019, 2:04 PM      Please clarify work status. Employer is implying that Dr. Fidencio Ya last office visit was allowing him to go back to work, but by states that  All activities with no restrictions for for therapy restrictions. Patient has scheduled a follow up on 5/6/19 for post PT eval for RTW status.

## 2019-05-03 ENCOUNTER — HOSPITAL ENCOUNTER (OUTPATIENT)
Dept: PHYSICAL THERAPY | Age: 59
Setting detail: THERAPIES SERIES
Discharge: HOME | End: 2019-05-03
Payer: COMMERCIAL

## 2019-05-03 PROCEDURE — 97140 MANUAL THERAPY 1/> REGIONS: CPT

## 2019-05-03 PROCEDURE — 97530 THERAPEUTIC ACTIVITIES: CPT

## 2019-05-03 PROCEDURE — 97110 THERAPEUTIC EXERCISES: CPT

## 2019-05-03 NOTE — FLOWSHEET NOTE
Outpatient Physical Therapy  [place]           [x] Phone: [phone]   Fax: [phone]  [facility]           [] Phone: [phone]   Fax: [phone]        Physical Therapy Daily Treatment Note  Date:  5/3/2019    Patient Name:  Nicole Godfrey    :    MRN: [id number]  Restrictions/Precautions: Other position/activity restrictions: in sling  Diagnosis:   Diagnosis: R RC repair 19  Date of Injury/Surgery: R RC repair 19  Treatment Diagnosis: Treatment Diagnosis: R shoulder stiffness    Insurance/Certification information: PT Insurance Information: BC/BS 20 sessions PCY   Referring Physician:  Referring Practitioner: Oksana Portillo  Next Doctor Visit:    Plan of care signed (Y/N):    Outcome Measure: Quick DASH  Visit# / total visits:        Pain level: 0/10 R shldr  Goals:       Short term goals  Long term goals  Time Frame for Long term goals : check in 12 weeks  Long term goal 1: patient's goal- maximize function of R UE  Long term goal 2: 3+/5 R shoulder FLEX MMT  Long term goal 3:  Quick DASH    Summary of Evaluation:    Patient primary complaints: R shoulder stiffness        History of condition:R RC repair 19          Current functional limitations: in sling @ all times  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 Oksana Portillo return to work date  Any changes in Ambulatory Summary Sheet?   None        Objective:   R IR behind back to T10- shoulder FLEX strength @least 4-/5    Exercises: (No more than 4 columns)   Exercise/Equipment 19 #14 19  #15 19 #16 2019 (17) 5/3/19 #18             WARM UP UBE FWD/BWD 5 min ea UBE FWD/BWD 5 min ea UBE FWD/BWD 5 min ea UBE FWD/BWD 5 min ea UBE providers:        Assessment: , 0/10 pain      Plan for Next Session: Cont PT progress as toelrated      Time In / Time Out: 943/1025  Timed Code/Total Treatment Minutes:   8\"MT x1  10\" TA x 1, 22\" TE x 1/42\"  ext Progress Note due:  10 sessions      Plan of Care Interventions:  [x] Therapeutic Exercise  [] Modalities:  [x] Therapeutic Activity     [] Ultrasound  [] Estem  [] Gait Training      [] Cervical Traction [] Lumbar Traction  [x] Neuromuscular Re-education    [] Cold/hotpack [] Iontophoresis   [x] Instruction in HEP      [x] Vasopneumatic   [] Dry Needling    [x] Manual Therapy               [] Aquatic Therapy              Electronically signed by:  Diandra Raymundo 5/3/2019, 10:17 AM

## 2019-05-06 ENCOUNTER — OFFICE VISIT (OUTPATIENT)
Dept: ORTHOPEDIC SURGERY | Age: 59
End: 2019-05-06
Payer: COMMERCIAL

## 2019-05-06 VITALS — HEART RATE: 74 BPM | OXYGEN SATURATION: 96 %

## 2019-05-06 DIAGNOSIS — Z98.890 S/P ARTHROSCOPY OF RIGHT SHOULDER: Primary | ICD-10-CM

## 2019-05-06 PROCEDURE — 99212 OFFICE O/P EST SF 10 MIN: CPT | Performed by: ORTHOPAEDIC SURGERY

## 2019-05-06 ASSESSMENT — ENCOUNTER SYMPTOMS: COLOR CHANGE: 0

## 2019-05-06 NOTE — PROGRESS NOTES
Pooja Spenser is here for his follow up on his OPERATIONS PERFORMED:  1.  Diagnostic arthroscopy, right shoulder with rotator cuff repair  using three Speed Screw anchors with PerfectPass suture passer. 2.  Right shoulder arthroscopy with subacromial decompression. 3.  Right shoulder arthroscopy with distal clavicle resection. 4.  Right shoulder arthroscopy with extensive debridement. 5.  Injection FlowerFLo 2 mL into rotator cuff repair site    DOS 01/10/19    He is doing well post op. Home exercises and therapy.

## 2019-05-06 NOTE — PROGRESS NOTES
Subjective:      Patient ID: Ale Banks is a 60 y.o. male. He comes in today for his 4 month postop recheck after right shoulder arthroscopy with rotator cuff repair. Overall he feels that he is continuing to progress extremely well and he is very happy with his progress and feels like he has returned to almost normal.   Patient denies any new injury to the involved extremity/ joint, denies numbness or tingling in the involved extremity and denies fever or chills. He  now states that he is able to reach back to hold a squat bar behind his neck. Shoulder Pain    Pertinent negatives include no fever or numbness. Review of Systems   Constitutional: Negative for activity change, chills and fever. Musculoskeletal: Negative for arthralgias, gait problem, joint swelling and myalgias. Skin: Negative for color change, pallor, rash and wound. Neurological: Negative for weakness and numbness. Objective:   Physical Exam   Constitutional: He is oriented to person, place, and time. Vital signs are normal. He appears well-developed and well-nourished. HENT:   Head: Normocephalic and atraumatic. Eyes: Pupils are equal, round, and reactive to light. Neck: Normal range of motion. Musculoskeletal: Normal range of motion. He exhibits no edema, tenderness or deformity. Right shoulder: He exhibits normal range of motion, no tenderness, no bony tenderness, no swelling, no effusion, no crepitus, no deformity, no laceration, no pain, no spasm, normal pulse and normal strength. Left shoulder: He exhibits normal range of motion, no tenderness, no bony tenderness, no swelling, no effusion, no crepitus, no deformity, no laceration, no pain, no spasm, normal pulse and normal strength. Neurological: He is alert and oriented to person, place, and time. He has normal strength. No sensory deficit. Skin: Skin is warm and dry. No rash noted. No erythema. No pallor.      Right shoulder-incision well healed, no redness, no signs of infection. Full range of motion in all directions, strength 5/5. Assessment:      Right shoulder arthroscopic rotator cuff repair, 4 months       Plan:      I discussed with him today that he is  continuing to progress extremely well. At this point he can resume all activities with no restrictions. I explained to him that maximum improvement is 9 months after this type of surgery. Continue weight-bearing as tolerated. Continue range of motion exercises as instructed. Ice and elevate as needed. Tylenol or Motrin for pain. Follow up as needed.          Dick [address], DO

## 2019-05-06 NOTE — LETTER
[address] [place] [address] and Sports [facility]  [address] [facility]. [address] [place] [address]  Phone: [phone]  Fax: [phone]    Mac Smith DO        May 6, 2019     Patient: Lizeth Iraheta   YOB: 1960   Date of Visit: 5/6/2019       To Whom It May Concern: It is my medical opinion that Doroteo Aguilar may return to full duty immediately with no restrictions. If you have any questions or concerns, please don't hesitate to call.     Sincerely,        Mac Smith DO

## (undated) DEVICE — GOWN,SIRUS,POLYRNF,BRTHSLV,XLN/XL,20/CS: Brand: MEDLINE

## (undated) DEVICE — Z INACTIVE USE 2660664 SOLUTION IRRIG 3000ML 0.9% SOD CHL USP UROMATIC PLAS CONT

## (undated) DEVICE — CHLORAPREP 26ML ORANGE

## (undated) DEVICE — DRAPE,U/ SHT,SPLIT,PLAS,STERIL: Brand: MEDLINE

## (undated) DEVICE — NEEDLE SPNL 18GA L6IN PNK LNG LEN DISP

## (undated) DEVICE — GLOVE SURG SZ 7 FNGR THK94MIL TRNSLUC YEL LTX HYDRGEL GRP

## (undated) DEVICE — [AGGRESSIVE PLUS CUTTER, ARTHROSCOPIC SHAVER BLADE,  DO NOT RESTERILIZE,  DO NOT USE IF PACKAGE IS DAMAGED,  KEEP DRY,  KEEP AWAY FROM SUNLIGHT]: Brand: FORMULA

## (undated) DEVICE — [AGGRESSIVE 6-FLUTE BARREL BUR, ARTHROSCOPIC SHAVER BLADE,  DO NOT RESTERILIZE,  DO NOT USE IF PACKAGE IS DAMAGED,  KEEP DRY,  KEEP AWAY FROM SUNLIGHT]: Brand: FORMULA

## (undated) DEVICE — TROCAR ENDOSCP L150 MM DIA15MM THRD FOR KII OPT ACCS SYS COR39] APPLIED MEDICAL RESOURCES]

## (undated) DEVICE — SPONGE GZ W4XL8IN COT WVN 12 PLY

## (undated) DEVICE — COUNTER NDL 60 COUNT FOAM STRP SGL MAG

## (undated) DEVICE — SHOULDER PK

## (undated) DEVICE — LOOP VES W25MM THK1MM MAXI RED SIL FLD REPELLENT 100 PER

## (undated) DEVICE — STERILE POLYISOPRENE POWDER-FREE SURGICAL GLOVES WITH EMOLLIENT COATING: Brand: PROTEXIS

## (undated) DEVICE — YANKAUER,FLEXIBLE HANDLE,REGLR CAPACITY: Brand: MEDLINE INDUSTRIES, INC.

## (undated) DEVICE — DRAPE SHEET ULTRAGARD: Brand: MEDLINE

## (undated) DEVICE — SLING ARM M L1375IN D75IN WHT POLY MESH ENVELOP MTL SIDE

## (undated) DEVICE — 3M™ COBAN™ STERILE SELF-ADHERENT WRAP, 1584S, 4 IN X 5 YD (10 CM X 4,5 M), 18 ROLLS/CASE: Brand: 3M™ COBAN™

## (undated) DEVICE — SOLUTION PREP POVIDONE IOD FOR SKIN MUCOUS MEM PRIOR TO

## (undated) DEVICE — TUBING, SUCTION, 9/32" X 10', STRAIGHT: Brand: MEDLINE

## (undated) DEVICE — LINER,SEMI-RIGID,3000CC,50EA/CS: Brand: MEDLINE

## (undated) DEVICE — PROTECTOR EYE PT SELF ADH NS OPT GRD LF

## (undated) DEVICE — SMARTSTITCH PERFECTPASSER CONNECTOR: Brand: PERFECTPASSER

## (undated) DEVICE — GLOVE ORANGE PI 7 1/2   MSG9075

## (undated) DEVICE — SUTURE ETHLN SZ 3-0 L30IN NONABSORBABLE BLK FS-1 L24MM 3/8 669H

## (undated) DEVICE — GAUZE,SPONGE,4"X4",16PLY,XRAY,STRL,LF: Brand: MEDLINE

## (undated) DEVICE — SYRINGE MED 30ML STD CLR PLAS LUERSLIP TIP N CTRL DISP

## (undated) DEVICE — SLEEVE TRAC SPANDEX LAT W/ 4IN COBAN SUPERFICIAL RAD NRV PD

## (undated) DEVICE — LINER SUCT CANSTR 1500CC SEMI RIG W/ POR HYDROPHOBIC SHUT

## (undated) DEVICE — AMBIENT SUPER TURBOVAC 90: Brand: COBLATION

## (undated) DEVICE — GLOVE SURG SZ 8 L12IN FNGR THK87MIL WHT LTX FREE

## (undated) DEVICE — PAD FLR ABS FL BARR BACK DISP 46X40IN

## (undated) DEVICE — CIRCUIT BREATHING SINGLE LIMB AD 72 IN 3 LT 2 FILTER LIMBO

## (undated) DEVICE — GOWN,SIRUS,FABRNF,RAGLAN,2XL,ST,28/CS: Brand: MEDLINE

## (undated) DEVICE — MANIFOLD CART ULT HI FLOW W 3 PRT FOR SUCT

## (undated) DEVICE — TUBING PMP L16FT MAIN DISP FOR AR-6400 AR-6475

## (undated) DEVICE — SMARTSTITCH PERFECTPASSER                                    MAGNUMWIRE SUTURE CARTRIDGES, BLUE CO-BRAID: Brand: SMARTSTITCH PERFECTPASSER

## (undated) DEVICE — BLADE CLIPPER GEN PURP NS

## (undated) DEVICE — GLOVE ORANGE PI 7   MSG9070

## (undated) DEVICE — GLOVE SURG SZ 75 L12IN FNGR THK87MIL WHT LTX FREE

## (undated) DEVICE — NEEDLE SPNL 25GA L3.5IN BLU HUB S STL REG WALL FIT STYL W/

## (undated) DEVICE — CATHETER ETER IV L1IN OD22GA POLYUR PERIPH WNG HUB SFTY SHLD

## (undated) DEVICE — STANDARD HYPODERMIC NEEDLE,POLYPROPYLENE HUB: Brand: MONOJECT

## (undated) DEVICE — SMARTSTITCH PERFECTPASSER                                    MAGNUMWIRE SUTURE CARTRIDGES, WHITE: Brand: SMARTSTITCH PERFECTPASSER

## (undated) DEVICE — NEEDLE SPNL 25GA L3.5IN BLU HUB S STL PNCL PNT W/O INTRO

## (undated) DEVICE — TOWEL,OR,DSP,ST,BLUE,STD,6/PK,12PK/CS: Brand: MEDLINE

## (undated) DEVICE — NEEDLE HYPO 23GA L1.5IN TURQ POLYPR HUB S STL THN WALL IM

## (undated) DEVICE — SOLUTION IV IRRIG WATER 1000ML POUR BRL 2F7114

## (undated) DEVICE — DRESSING PETRO W3XL3IN OIL EMUL N ADH GZ KNIT IMPREG CELOS

## (undated) DEVICE — 3M™ STERI-DRAPE™ U-DRAPE 1067 1067 5/BX 4BX/CS/CTN&#X20;: Brand: STERI-DRAPE™

## (undated) DEVICE — INTENDED FOR TISSUE SEPARATION, AND OTHER PROCEDURES THAT REQUIRE A SHARP SURGICAL BLADE TO PUNCTURE OR CUT.: Brand: BARD-PARKER ® STAINLESS STEEL BLADES

## (undated) DEVICE — BANDAGE,SELF ADHRNT,COFLEX,4"X5YD,STRL: Brand: COLABEL

## (undated) DEVICE — Z INACTIVE USE 2635503 SOLUTION IRRIG 3000ML ST H2O USP UROMATIC PLAS CONT

## (undated) DEVICE — TROCAR ENDOSCP L100MM DIA12MM BLDELSS OBT RADLUC STBL SL

## (undated) DEVICE — PAD,ABDOMINAL,5"X9",ST,LF,25/BX: Brand: MEDLINE INDUSTRIES, INC.

## (undated) DEVICE — MAT FLOOR ULTRA ABS 28X48IN

## (undated) DEVICE — IMMOBILIZER SHLDR SWTH UNIV DEV BLK P.A.D. III

## (undated) DEVICE — GLOVE SURG SZ 6 THK91MIL LTX FREE SYN POLYISOPRENE ANTI

## (undated) DEVICE — GOWN,SIRUS,FABRNF,RAGLAN,L,ST,30/CS: Brand: MEDLINE

## (undated) DEVICE — GLOVE SURG SZ 8 L12IN THK75MIL DK GRN LTX FREE